# Patient Record
Sex: MALE | Race: WHITE | NOT HISPANIC OR LATINO | Employment: OTHER | URBAN - METROPOLITAN AREA
[De-identification: names, ages, dates, MRNs, and addresses within clinical notes are randomized per-mention and may not be internally consistent; named-entity substitution may affect disease eponyms.]

---

## 2018-05-09 ENCOUNTER — IMPORTED ENCOUNTER (OUTPATIENT)
Dept: URBAN - METROPOLITAN AREA CLINIC 9 | Facility: CLINIC | Age: 58
End: 2018-05-09

## 2018-07-11 ENCOUNTER — IMPORTED ENCOUNTER (OUTPATIENT)
Dept: URBAN - METROPOLITAN AREA CLINIC 9 | Facility: CLINIC | Age: 58
End: 2018-07-11

## 2018-11-14 ENCOUNTER — IMPORTED ENCOUNTER (OUTPATIENT)
Dept: URBAN - METROPOLITAN AREA CLINIC 9 | Facility: CLINIC | Age: 58
End: 2018-11-14

## 2019-06-25 ENCOUNTER — IMPORTED ENCOUNTER (OUTPATIENT)
Dept: URBAN - METROPOLITAN AREA CLINIC 9 | Facility: CLINIC | Age: 59
End: 2019-06-25

## 2019-06-26 ENCOUNTER — IMPORTED ENCOUNTER (OUTPATIENT)
Dept: URBAN - METROPOLITAN AREA CLINIC 9 | Facility: CLINIC | Age: 59
End: 2019-06-26

## 2019-10-02 ENCOUNTER — IMPORTED ENCOUNTER (OUTPATIENT)
Dept: URBAN - METROPOLITAN AREA CLINIC 9 | Facility: CLINIC | Age: 59
End: 2019-10-02

## 2020-02-25 NOTE — PROCEDURE NOTE: SURGICAL
MR #: [id number]<KW /><br />PREOPERATIVE DIAGNOSIS: Cataract, right eye.<br /><br />POSTOPERATIVE DIAGNOSIS: Same.<br /><br />OPERATIVE PROCEDURE: Phacoemulsification with +21.0 diopter Landon &amp; Landon AAB00, PC-IOL, right eye.<br /><br />PROCEDURE:&nbsp; Following sedation, Chanel Silva CRNA administered regional anesthesia by infiltration as per the anesthetic record. <br /><br />Prior to commencing surgery patient identification, surgical procedure, site, and side were confirmed by Dr. Adrien Wiley. &nbsp; The patient was then prepped with Betadine and draped with sterile drapes. A lid speculum was placed and the side port incision prepared. &nbsp; 0.5 cc of Epi-Shugarcaine was instilled and the anterior chamber entered at the temporal 180&deg; limbus in a single plane fashion employing a 2.7 mm trapezoid milton and ring fixation. Viscoelastic was placed, a circular capsulorhexis performed, hydrodissection accomplished and the nucleus emulsified within the posterior chamber. Cortex was aspirated with the I/A handpiece, the posterior capsule polished and viscoelastic instilled to distend the capsular sac. A +21.0 diopter Landon &amp; Soraida Mcardle was placed into the bag under direct visualization without difficulty employing the microinjector. Viscoelastic was aspirated with the I/A handpiece and the anterior chamber pressurized with BSS. The incision was tested for leaks and none were found. A single injection of 0.2 cc of Moxifloxacin was placed in the anterior chamber. The patient returned to the holding area having tolerated the procedure extremely well and without complication. <br /><br />Epi-shugarcaine consists of a mixture of 1cc epinephrine MPF 1:1000, 0.75 cc 4% lidocaine MPF and 2.25 cc BSS. Moxifloxacin consists of a mixture of Vigamox and BSS 1 mg/1 ml solution. <br /><br /><br />

## 2020-03-03 NOTE — PROCEDURE NOTE: SURGICAL
MR #: [id number]<NI /><br />PREOPERATIVE DIAGNOSIS: Cataract, left eye.<br /><br />POSTOPERATIVE DIAGNOSIS: Same.<br /><br />OPERATIVE PROCEDURE: Phacoemulsification with +21.5 diopter Landon &amp; Landon AAB00, PC-IOL, left eye.<br /><br />PROCEDURE:&nbsp; Following sedation, Cynthia Glasgow CRNA administered regional anesthesia by infiltration as per the anesthetic record. <br /><br />Prior to commencing surgery patient identification, surgical procedure, site, and side were confirmed by Dr. Merlin Becker. &nbsp; The patient was then prepped with Betadine and draped with sterile drapes. A lid speculum was placed and the side port incision prepared. &nbsp; 0.5 cc of Epi-Shugarcaine was instilled and the anterior chamber entered at the temporal 180&deg; limbus in a single plane fashion employing a 2.7 mm trapezoid milton and ring fixation. Viscoelastic was placed, a circular capsulorhexis performed, hydrodissection accomplished and the nucleus emulsified within the posterior chamber. Cortex was aspirated with the I/A handpiece, the posterior capsule polished and viscoelastic instilled to distend the capsular sac. A +21.5 diopter Landon &amp; Huizar Pique was placed into the bag under direct visualization without difficulty employing the microinjector. Viscoelastic was aspirated with the I/A handpiece and the anterior chamber pressurized with BSS. The incision was tested for leaks and none were found. A single injection of 0.2 cc of Moxifloxacin was placed in the anterior chamber. The patient returned to the holding area having tolerated the procedure extremely well and without complication. <br /><br />Epi-shugarcaine consists of a mixture of 1cc epinephrine MPF 1:1000, 0.75 cc 4% lidocaine MPF and 2.25 cc BSS. Moxifloxacin consists of a mixture of Vigamox and BSS 1 mg/1 ml solution. <br /><br /><br />

## 2020-03-11 ENCOUNTER — IMPORTED ENCOUNTER (OUTPATIENT)
Dept: URBAN - METROPOLITAN AREA CLINIC 9 | Facility: CLINIC | Age: 60
End: 2020-03-11

## 2020-05-04 NOTE — PATIENT DISCUSSION
Done.   The patient feels that the cataract is significantly impacting daily activities and has elected cataract surgery. The risks, benefits, and alternatives to surgery were discussed. The patient elects to proceed with surgery.

## 2020-08-28 ENCOUNTER — IMPORTED ENCOUNTER (OUTPATIENT)
Dept: URBAN - METROPOLITAN AREA CLINIC 9 | Facility: CLINIC | Age: 60
End: 2020-08-28

## 2021-01-01 NOTE — PATIENT DISCUSSION
Discussed the importance of blood sugar control in the prevention of ocular complications. negative... initiation of breastfeeding/breast milk feeding

## 2021-02-26 ENCOUNTER — IMPORTED ENCOUNTER (OUTPATIENT)
Dept: URBAN - METROPOLITAN AREA CLINIC 9 | Facility: CLINIC | Age: 61
End: 2021-02-26

## 2021-08-24 ENCOUNTER — IMPORTED ENCOUNTER (OUTPATIENT)
Dept: URBAN - METROPOLITAN AREA CLINIC 9 | Facility: CLINIC | Age: 61
End: 2021-08-24

## 2021-10-16 ASSESSMENT — VISUAL ACUITY
OD_PH: 20/40 +2 SN
OD_PH: 20/40 + SN
OS_CC: 20/20 SN
OD_PH: 20/25 -2 SN
OD_PH: 20/30 - SN
OD_CC: 20/40 SN
OS_PH: 20/80 -2 SN
OD_PH: 20/30 -2 SN
OD_PH: 20/40 - SN
OS_SC: 20/400 SN

## 2021-10-16 ASSESSMENT — KERATOMETRY
OS_K1POWER_DIOPTERS: 46.75
OD_AXISANGLE2_DEGREES: 169
OD_AXISANGLE_DEGREES: 79
OS_AXISANGLE2_DEGREES: 52
OD_K2POWER_DIOPTERS: 47.25
OD_K1POWER_DIOPTERS: 42
OS_AXISANGLE_DEGREES: 142
OS_K2POWER_DIOPTERS: 58.75

## 2021-10-16 ASSESSMENT — TONOMETRY
OD_IOP_MMHG: 10
OD_IOP_MMHG: 11
OD_IOP_MMHG: 21
OD_IOP_MMHG: 11
OS_IOP_MMHG: 11
OS_IOP_MMHG: 11
OD_IOP_MMHG: 10
OS_IOP_MMHG: 13
OS_IOP_MMHG: 12
OS_IOP_MMHG: 10
OS_IOP_MMHG: 10
OD_IOP_MMHG: 9
OS_IOP_MMHG: 11
OD_IOP_MMHG: 12
OS_IOP_MMHG: 21

## 2022-03-01 ENCOUNTER — ESTABLISHED PATIENT (OUTPATIENT)
Dept: URBAN - METROPOLITAN AREA CLINIC 4 | Facility: CLINIC | Age: 62
End: 2022-03-01

## 2022-03-01 DIAGNOSIS — H10.12: ICD-10-CM

## 2022-03-01 DIAGNOSIS — H16.223: ICD-10-CM

## 2022-03-01 DIAGNOSIS — Z94.7: ICD-10-CM

## 2022-03-01 DIAGNOSIS — H25.13: ICD-10-CM

## 2022-03-01 DIAGNOSIS — H18.613: ICD-10-CM

## 2022-03-01 DIAGNOSIS — H04.123: ICD-10-CM

## 2022-03-01 PROCEDURE — 99213 OFFICE O/P EST LOW 20 MIN: CPT

## 2022-03-01 ASSESSMENT — TONOMETRY
OD_IOP_MMHG: 12
OS_IOP_MMHG: 12

## 2022-03-01 ASSESSMENT — VISUAL ACUITY
OS_SC: 20/30
OD_CC: J2
OU_CC: J1+
OS_CC: J1
OD_PH: 20/40-1
OD_SC: 20/50
OU_SC: 20/40

## 2022-03-07 NOTE — PATIENT DISCUSSION
Please call patient to schedule to see Tabby, we cannot send a prescription without her being seen. Thank you    See #[phone].

## 2022-11-08 NOTE — PATIENT DISCUSSION
Recommended PF artificial tears to use: 1 drop 4x a day in both eyes. Continue use of gel AT's uring the day and aguila at night.

## 2022-12-13 ENCOUNTER — ESTABLISHED PATIENT (OUTPATIENT)
Dept: URBAN - METROPOLITAN AREA CLINIC 4 | Facility: CLINIC | Age: 62
End: 2022-12-13

## 2022-12-13 PROCEDURE — 92014 COMPRE OPH EXAM EST PT 1/>: CPT

## 2022-12-13 ASSESSMENT — VISUAL ACUITY
OD_CC: 20/50
OS_PH: 20/30-2
OU_CC: 20/30-1
OS_CC: 20/40+1
OD_PH: 20/40-2

## 2022-12-13 ASSESSMENT — TONOMETRY
OD_IOP_MMHG: 7
OS_IOP_MMHG: 7
